# Patient Record
Sex: FEMALE | Race: BLACK OR AFRICAN AMERICAN | Employment: FULL TIME | URBAN - METROPOLITAN AREA
[De-identification: names, ages, dates, MRNs, and addresses within clinical notes are randomized per-mention and may not be internally consistent; named-entity substitution may affect disease eponyms.]

---

## 2023-09-18 NOTE — H&P
[address] [facility] - Gastroenterology                                                                                                               Reason for consult: abominal pain    Requesting physician or provider: Treva Cranker, APRN    Patient presents with:  Consult: Stomach discomfort       HPI:   Jeremy Wilder is a 47year old year-old female with medical history including diabetes, HTN.    she is here today for evaluation of abdominal pain  #abdominal pain  -reports upper abdominal pain, mostly over epigastric for about 6 months, associated with fullness and bloating  -pain is worse after eating and has postprandial fullness  -tried taking tums without relief    Patient denies symptoms of nausea, vomiting, dyspepsia, dysphagia, odynophagia, globus sensation, heartburn, hematemesis, change in bowel habits, constipation, diarrhea, hematochezia, or melena. she denies recent change in appetite, fever or unintentional weight loss. she moves her bowels 1 time per day that is soft formed stool and without recent change. she denies straining and/or incomplete evacuation. Last colonoscopy: none - -patient last did cologuard in 2021, normal results per pt (PC[facility])  Last EGD: none     NSAIDS: occasional  Tobacco: none   Alcohol:rare   Marijuana:none   Illicit drugs:none     FH GI malignancy: none     No history of adverse reaction to sedation  No TITUS  No anticoagulants  No pacemaker/defibrillator  No pain medications and/or sleep aides    Wt Readings from Last 6 Encounters:  09/19/23 : 267 lb (121.1 kg)       History, Medications, Allergies, ROS:      Past Medical History:   Diagnosis Date    Diabetes ([facility] [address]       History reviewed. No pertinent surgical history. Family Hx: History reviewed. No pertinent family history.    Social History:   Social History     Socioeconomic History    Marital status:    Tobacco Use    Smoking status: Never    Smokeless tobacco: Never        Medications (Active prior to today's visit):  Current Outpatient Medications   Medication Sig Dispense Refill    carvedilol 12.5 MG Oral Tab       BASAGLAR KWIKPEN 100 UNIT/ML Subcutaneous Solution Pen-injector Inject 24 units every day by subcutaneous route. losartan 100 MG Oral Tab Take 1 tablet (100 mg total) by mouth daily. metFORMIN HCl 1000 MG Oral Tab       famotidine 20 MG Oral Tab Take 1 tablet (20 mg total) by mouth daily. 90 tablet 0       Allergies:  Not on File    ROS:   CONSTITUTIONAL: negative for fevers, chills, sweats and weight loss  EYES Negative for scleral icterus or redness, and diplopia  HEENT: Negative for dysphagia and hoarseness  RESPIRATORY: Negative for cough and severe shortness of breath  CARDIOVASCULAR: Negative for crushing sub-sternal chest pain  GASTROINTESTINAL: See HPI  GENITOURINARY: Negative for dysuria  MUSCULOSKELETAL: Negative for arthralgias and myalgias  SKIN: Negative for jaundice, rash or pruritus  NEUROLOGICAL: Negative for dizziness and headaches  BEHAVIOR/PSYCH: Negative for psychotic behavior and poor appetite    PHYSICAL EXAM:   Height 5' 8\" (1.727 m), weight 267 lb (121.1 kg). GEN: Alert, no acute distress, well-nourished   HEENT: anicteric sclera, neck supple, trachea midline, MMM, no palpable or tender neck or supraclavicular lymph nodes  CV: RRR, the extremities are warm and well perfused   LUNGS: No increased work of breathing, CTAB  ABDOMEN: +tender over epigastric abdomen, Soft, symmetrical, without distention or guarding. No scars or lesions. Aorta is without bruit or visible pulsation. Umbilicus is midline without herniation. Normoactive bowel sounds are present, No masses, hepatomegaly or splenomegaly noted.   MSK: No erythema, no warmth, no swelling of joints  SKIN: No jaundice, no erythema, no rashes, no lesions  HEMATOLOGIC: No bleeding, no bruising  NEURO: Alert and interactive, ESPANA  PSYCH: appropriate mood & affect    Labs/Imaging/Procedures:     Patient's pertinent labs and imaging were reviewed and discussed with patient today. .  ASSESSMENT/PLAN:   Deepak Shipley is a 47year old year-old female with medical history including diabetes, HTN. she is here today for evaluation of abdominal pain    #abdominal pain  -reports upper abdominal pain, mostly over epigastric for about 6 months, associated with fullness and bloating  -pain is worse after eating and has postprandial fullness  -tried taking tums without relief  -Differentials include but not limited to: PUD, GERD, gastritis, functional dyspepsia, gastroparesis,   -labs from [facility] 8/26/23  ---CMP unremarkable  ---lipid panel unremarkable  ---CBC notable for hgb 9.9, MCH 24.8, MCHC 30.7 (pt reports anemia for decades, not on iron, has never required blood transfusion)  ---hemoglobin A1C 10.7 (previously 14)  ---TSH unremarkable    #CRC screen  -patient last did cologuard in 2021, normal results per pt    Recommendations:  -go to lab to have blood drawn (celiac disease blood test & H. Pylori breath test)  -can take famotidine (pepcid) 20mg daily (can increase it twice daily if needed) - take 30 minutes before meals  -can take simethicone (gas-x) or pepto-bismol for gas & bloating as needed  -continue to work with PCP to improve A1C  -**Please contact your PCP about insulin dosing prior to procedure      Orders This Visit:  Orders Placed This Encounter      Tissue Transglutaminase Ab, IgA      Immunoglobulin A, Quant      Helicobacter Pylori Breath Test, Adult      Meds This Visit:  Requested Prescriptions     Signed Prescriptions Disp Refills    famotidine 20 MG Oral Tab 90 tablet 0     Sig: Take 1 tablet (20 mg total) by mouth daily.        Imaging & Referrals:  None      Edwina Harvey [facility]  9/18/2023        This note was partially prepared using [employer] recognition dictation software. As a result, errors may occur. When identified, these errors have been corrected.  While every attempt is made to correct errors during dictation, discrepancies may still exist.

## 2023-09-19 ENCOUNTER — TELEPHONE (OUTPATIENT)
Facility: CLINIC | Age: 53
End: 2023-09-19

## 2023-09-19 ENCOUNTER — OFFICE VISIT (OUTPATIENT)
Facility: CLINIC | Age: 53
End: 2023-09-19

## 2023-09-19 ENCOUNTER — LAB ENCOUNTER (OUTPATIENT)
Dept: LAB | Facility: HOSPITAL | Age: 53
End: 2023-09-19
Payer: COMMERCIAL

## 2023-09-19 VITALS — WEIGHT: 267 LBS | BODY MASS INDEX: 40.47 KG/M2 | HEIGHT: 68 IN

## 2023-09-19 DIAGNOSIS — R14.0 BLOATING: Primary | ICD-10-CM

## 2023-09-19 DIAGNOSIS — R10.13 EPIGASTRIC PAIN: ICD-10-CM

## 2023-09-19 DIAGNOSIS — R14.0 BLOATING: ICD-10-CM

## 2023-09-19 LAB — IGA SERPL-MCNC: 367 MG/DL (ref 70–312)

## 2023-09-19 PROCEDURE — 99244 OFF/OP CNSLTJ NEW/EST MOD 40: CPT

## 2023-09-19 PROCEDURE — 83013 H PYLORI (C-13) BREATH: CPT

## 2023-09-19 PROCEDURE — 36415 COLL VENOUS BLD VENIPUNCTURE: CPT

## 2023-09-19 PROCEDURE — 82784 ASSAY IGA/IGD/IGG/IGM EACH: CPT

## 2023-09-19 PROCEDURE — 86364 TISS TRNSGLTMNASE EA IG CLAS: CPT

## 2023-09-19 PROCEDURE — 3008F BODY MASS INDEX DOCD: CPT

## 2023-09-19 RX ORDER — INSULIN GLARGINE 100 [IU]/ML
INJECTION, SOLUTION SUBCUTANEOUS
COMMUNITY

## 2023-09-19 RX ORDER — LOSARTAN POTASSIUM 100 MG/1
1 TABLET ORAL DAILY
COMMUNITY

## 2023-09-19 RX ORDER — CARVEDILOL 12.5 MG/1
TABLET ORAL
COMMUNITY
Start: 2023-08-18

## 2023-09-19 RX ORDER — FAMOTIDINE 20 MG/1
20 TABLET, FILM COATED ORAL DAILY
Qty: 90 TABLET | Refills: 0 | Status: SHIPPED | OUTPATIENT
Start: 2023-09-19 | End: 2023-09-20

## 2023-09-19 NOTE — TELEPHONE ENCOUNTER
Scheduled for:  EGD 02446  Provider Name:  Dr Charissa Peterson  Date:  11/22/2023  Location:  [facility]  Sedation:  MAC  Time:  7150 (pt is aware to arrive at  1030)    Prep:  NPO AFTER MIDNIGHT  Meds/Allergies Reconciled?: Physician reviewed  Diagnosis with codes:  BLOATING R14.0; EPIGASTRIC PAIN R10.13  Was patient informed to call insurance with codes (Y/N):       Referral sent?:  Referral was sent at the time of electronic surgical scheduling. [address] [facility] or [id number]  notified?:  I sent an electronic request to Endo Scheduling and received a confirmation today. Medication Orders:  Pt is aware to NOT take iron pills, herbal meds and diet supplements for 7 days before exam. Also to NOT take any form of alcohol, recreational drugs and any forms of ED meds 24 hours before exam.     Misc Orders:       Further instructions given by staff:  I discussed the prep intructions with the patient in office which SHE verbally understood. Copy of instructions was handed to patient as well. Patient was also advised about cancellation policy.

## 2023-09-19 NOTE — PATIENT INSTRUCTIONS
-go to lab to have blood drawn (celiac disease blood test & H. Pylori breath test)  -can take famotidine (pepcid) 20mg daily (can increase it twice daily if needed) - take 30 minutes before meals  -can take simethicone (gas-x) or pepto-bismol for gas & bloating as needed  -continue to work with PCP to improve A1C  -**Please contact your PCP about insulin dosing prior to procedure  -follow up with me after endoscopy    1. Schedule upper endoscopy (EGD) with Dr. Nuria Mejia, Dr. Alfonzo Maradiaga or Dr. Wallace Betancur: EGD for new bloating & epigastric pain, also has chronic anemia]    2. If you start any NEW medication after your visit today, please notify us. Certain medications will need to be held before the procedure, or the procedure cannot be performed safely. 3. DO NOT TAKE: Iron (ferrous sulfate/ ferrous gluconate) pills, herbal supplements, multivitamins, or diet medications (i.e. Phentermine/Vyvanse) for 7 days before exam.  DO NOT TAKE: Any form of alcohol, recreational drugs and any forms of Erectile Dysfunction medications 24 hours prior to procedure. 4. The day BEFORE your procedure, NOTHING TO EAT OR DRINK AFTER MIDNIGHT! If your procedure is scheduled in the afternoon, you may have clear liquids only (see below) up to 3 hours before the time of your procedure. If you fail to keep your stomach empty for 3 hours prior to procedure time, your procedure may be CANCELLED.

## 2023-09-20 ENCOUNTER — TELEPHONE (OUTPATIENT)
Facility: CLINIC | Age: 53
End: 2023-09-20

## 2023-09-20 DIAGNOSIS — A04.8 H. PYLORI INFECTION: Primary | ICD-10-CM

## 2023-09-20 LAB
H PYLORI BREATH TEST: POSITIVE
TTG IGA SER-ACNC: 0.8 U/ML (ref ?–7)

## 2023-09-20 RX ORDER — OMEPRAZOLE 20 MG/1
20 CAPSULE, DELAYED RELEASE ORAL
Qty: 28 CAPSULE | Refills: 0 | Status: SHIPPED | OUTPATIENT
Start: 2023-09-20 | End: 2023-10-04

## 2023-09-20 RX ORDER — TETRACYCLINE HYDROCHLORIDE 500 MG/1
500 CAPSULE ORAL 4 TIMES DAILY
Qty: 56 CAPSULE | Refills: 0 | Status: SHIPPED | OUTPATIENT
Start: 2023-09-20 | End: 2023-10-04

## 2023-09-20 RX ORDER — METRONIDAZOLE 250 MG/1
250 TABLET ORAL 4 TIMES DAILY
Qty: 56 TABLET | Refills: 0 | Status: SHIPPED | OUTPATIENT
Start: 2023-09-20 | End: 2023-10-04

## 2023-09-20 RX ORDER — BISMUTH SUBSALICYLATE 262 MG/1
2 TABLET, CHEWABLE ORAL 4 TIMES DAILY
Qty: 112 TABLET | Refills: 0 | Status: SHIPPED | OUTPATIENT
Start: 2023-09-20 | End: 2023-10-04

## 2023-09-20 NOTE — TELEPHONE ENCOUNTER
GI RNs,   -I left Layne a voicemail & sent a [employer] message. Please try & reach patient this week about the following:     Eldon Tillman,     Your test results came back positive for H. Pylori. This may be the source of the symptoms you have been experiencing. The treatment is antibiotics. You will STOP taking the famotidine and START taking the new 4 medications I prescribed. I sent the medications to your [facility] mail pharmacy on file. It is a tedious regimen that lasts for 2 weeks. You may feel worse before you feel better. Tips for tolerating the medications are to add an over the counter probiotic or eat a yogurt daily. Make sure to NOT consume ANY alcohol while taking these medications. Call to make a follow up appt with me in 6 weeks, this will be about 4 weeks after you complete the treatment.   [phone]     BESSY Pantoja

## 2023-09-21 NOTE — TELEPHONE ENCOUNTER
Bynum Gitelman to [facility] (supporting BESSY Quarles)   FOUZIA      9/20/23  7:21 PM  Thank you so much Francois Oliver. I will proceed as directed. Eldon Paredes,    Please see note above.  Pt responded to your [employer] message

## 2023-11-02 ENCOUNTER — TELEPHONE (OUTPATIENT)
Facility: CLINIC | Age: 53
End: 2023-11-02

## 2023-11-02 DIAGNOSIS — R10.13 EPIGASTRIC PAIN: ICD-10-CM

## 2023-11-02 DIAGNOSIS — R14.0 BLOATING: Primary | ICD-10-CM

## 2023-11-02 NOTE — TELEPHONE ENCOUNTER
Cancelled in epic and endo        CANCELLEDd for:  EGD [id number]  Provider Name:  Dr Shahida Moe  Date:  11/22/2023  Location:  [facility]  Sedation:  MAC  Time:  1548